# Patient Record
Sex: FEMALE | Race: ASIAN | NOT HISPANIC OR LATINO | URBAN - METROPOLITAN AREA
[De-identification: names, ages, dates, MRNs, and addresses within clinical notes are randomized per-mention and may not be internally consistent; named-entity substitution may affect disease eponyms.]

---

## 2018-01-31 ENCOUNTER — EMERGENCY (EMERGENCY)
Facility: HOSPITAL | Age: 43
LOS: 1 days | Discharge: HOME | End: 2018-01-31
Attending: EMERGENCY MEDICINE | Admitting: EMERGENCY MEDICINE
Payer: COMMERCIAL

## 2018-01-31 VITALS
TEMPERATURE: 98 F | HEART RATE: 73 BPM | SYSTOLIC BLOOD PRESSURE: 100 MMHG | DIASTOLIC BLOOD PRESSURE: 59 MMHG | OXYGEN SATURATION: 98 % | RESPIRATION RATE: 16 BRPM

## 2018-01-31 VITALS
RESPIRATION RATE: 72 BRPM | DIASTOLIC BLOOD PRESSURE: 71 MMHG | HEART RATE: 16 BPM | TEMPERATURE: 98 F | OXYGEN SATURATION: 100 % | SYSTOLIC BLOOD PRESSURE: 101 MMHG

## 2018-01-31 LAB
ALBUMIN SERPL ELPH-MCNC: 4.5 G/DL — SIGNIFICANT CHANGE UP (ref 3.3–5)
ALP SERPL-CCNC: 71 U/L — SIGNIFICANT CHANGE UP (ref 40–120)
ALT FLD-CCNC: 48 U/L — HIGH (ref 4–33)
APPEARANCE UR: CLEAR — SIGNIFICANT CHANGE UP
APTT BLD: 35.4 SEC — SIGNIFICANT CHANGE UP (ref 27.5–37.4)
AST SERPL-CCNC: 39 U/L — HIGH (ref 4–32)
BASOPHILS # BLD AUTO: 0.06 K/UL — SIGNIFICANT CHANGE UP (ref 0–0.2)
BASOPHILS NFR BLD AUTO: 0.7 % — SIGNIFICANT CHANGE UP (ref 0–2)
BILIRUB SERPL-MCNC: 0.5 MG/DL — SIGNIFICANT CHANGE UP (ref 0.2–1.2)
BILIRUB UR-MCNC: NEGATIVE — SIGNIFICANT CHANGE UP
BLOOD UR QL VISUAL: NEGATIVE — SIGNIFICANT CHANGE UP
BUN SERPL-MCNC: 14 MG/DL — SIGNIFICANT CHANGE UP (ref 7–23)
CALCIUM SERPL-MCNC: 9.2 MG/DL — SIGNIFICANT CHANGE UP (ref 8.4–10.5)
CHLORIDE SERPL-SCNC: 100 MMOL/L — SIGNIFICANT CHANGE UP (ref 98–107)
CHOLEST SERPL-MCNC: 149 MG/DL — SIGNIFICANT CHANGE UP (ref 120–199)
CO2 SERPL-SCNC: 25 MMOL/L — SIGNIFICANT CHANGE UP (ref 22–31)
COLOR SPEC: SIGNIFICANT CHANGE UP
CREAT SERPL-MCNC: 0.82 MG/DL — SIGNIFICANT CHANGE UP (ref 0.5–1.3)
EOSINOPHIL # BLD AUTO: 0.06 K/UL — SIGNIFICANT CHANGE UP (ref 0–0.5)
EOSINOPHIL NFR BLD AUTO: 0.7 % — SIGNIFICANT CHANGE UP (ref 0–6)
GLUCOSE SERPL-MCNC: 87 MG/DL — SIGNIFICANT CHANGE UP (ref 70–99)
GLUCOSE UR-MCNC: NEGATIVE — SIGNIFICANT CHANGE UP
HBA1C BLD-MCNC: 5.3 % — SIGNIFICANT CHANGE UP (ref 4–5.6)
HCT VFR BLD CALC: 42.9 % — SIGNIFICANT CHANGE UP (ref 34.5–45)
HDLC SERPL-MCNC: 62 MG/DL — SIGNIFICANT CHANGE UP (ref 45–65)
HGB BLD-MCNC: 13.9 G/DL — SIGNIFICANT CHANGE UP (ref 11.5–15.5)
IMM GRANULOCYTES # BLD AUTO: 0.03 # — SIGNIFICANT CHANGE UP
IMM GRANULOCYTES NFR BLD AUTO: 0.3 % — SIGNIFICANT CHANGE UP (ref 0–1.5)
INR BLD: 0.98 — SIGNIFICANT CHANGE UP (ref 0.88–1.17)
KETONES UR-MCNC: NEGATIVE — SIGNIFICANT CHANGE UP
LEUKOCYTE ESTERASE UR-ACNC: NEGATIVE — SIGNIFICANT CHANGE UP
LIPID PNL WITH DIRECT LDL SERPL: 80 MG/DL — SIGNIFICANT CHANGE UP
LYMPHOCYTES # BLD AUTO: 1.94 K/UL — SIGNIFICANT CHANGE UP (ref 1–3.3)
LYMPHOCYTES # BLD AUTO: 21.7 % — SIGNIFICANT CHANGE UP (ref 13–44)
MCHC RBC-ENTMCNC: 28.4 PG — SIGNIFICANT CHANGE UP (ref 27–34)
MCHC RBC-ENTMCNC: 32.4 % — SIGNIFICANT CHANGE UP (ref 32–36)
MCV RBC AUTO: 87.6 FL — SIGNIFICANT CHANGE UP (ref 80–100)
MONOCYTES # BLD AUTO: 0.37 K/UL — SIGNIFICANT CHANGE UP (ref 0–0.9)
MONOCYTES NFR BLD AUTO: 4.1 % — SIGNIFICANT CHANGE UP (ref 2–14)
MUCOUS THREADS # UR AUTO: SIGNIFICANT CHANGE UP
NEUTROPHILS # BLD AUTO: 6.49 K/UL — SIGNIFICANT CHANGE UP (ref 1.8–7.4)
NEUTROPHILS NFR BLD AUTO: 72.5 % — SIGNIFICANT CHANGE UP (ref 43–77)
NITRITE UR-MCNC: NEGATIVE — SIGNIFICANT CHANGE UP
NRBC # FLD: 0 — SIGNIFICANT CHANGE UP
PH UR: 6 — SIGNIFICANT CHANGE UP (ref 4.6–8)
PLATELET # BLD AUTO: 234 K/UL — SIGNIFICANT CHANGE UP (ref 150–400)
PMV BLD: 10.1 FL — SIGNIFICANT CHANGE UP (ref 7–13)
POTASSIUM SERPL-MCNC: 4.3 MMOL/L — SIGNIFICANT CHANGE UP (ref 3.5–5.3)
POTASSIUM SERPL-SCNC: 4.3 MMOL/L — SIGNIFICANT CHANGE UP (ref 3.5–5.3)
PROT SERPL-MCNC: 7.6 G/DL — SIGNIFICANT CHANGE UP (ref 6–8.3)
PROT UR-MCNC: NEGATIVE MG/DL — SIGNIFICANT CHANGE UP
PROTHROM AB SERPL-ACNC: 10.9 SEC — SIGNIFICANT CHANGE UP (ref 9.8–13.1)
RBC # BLD: 4.9 M/UL — SIGNIFICANT CHANGE UP (ref 3.8–5.2)
RBC # FLD: 12.8 % — SIGNIFICANT CHANGE UP (ref 10.3–14.5)
RBC CASTS # UR COMP ASSIST: SIGNIFICANT CHANGE UP (ref 0–?)
SODIUM SERPL-SCNC: 138 MMOL/L — SIGNIFICANT CHANGE UP (ref 135–145)
SP GR SPEC: 1.01 — SIGNIFICANT CHANGE UP (ref 1–1.04)
SQUAMOUS # UR AUTO: SIGNIFICANT CHANGE UP
TRIGL SERPL-MCNC: 41 MG/DL — SIGNIFICANT CHANGE UP (ref 10–149)
UROBILINOGEN FLD QL: NORMAL MG/DL — SIGNIFICANT CHANGE UP
WBC # BLD: 8.95 K/UL — SIGNIFICANT CHANGE UP (ref 3.8–10.5)
WBC # FLD AUTO: 8.95 K/UL — SIGNIFICANT CHANGE UP (ref 3.8–10.5)
WBC UR QL: SIGNIFICANT CHANGE UP (ref 0–?)

## 2018-01-31 PROCEDURE — 70544 MR ANGIOGRAPHY HEAD W/O DYE: CPT | Mod: 26,59

## 2018-01-31 PROCEDURE — 70551 MRI BRAIN STEM W/O DYE: CPT | Mod: 26

## 2018-01-31 PROCEDURE — 99218: CPT

## 2018-01-31 PROCEDURE — 70450 CT HEAD/BRAIN W/O DYE: CPT | Mod: 26

## 2018-01-31 PROCEDURE — 70549 MR ANGIOGRAPH NECK W/O&W/DYE: CPT | Mod: 26

## 2018-01-31 NOTE — CONSULT NOTE ADULT - SUBJECTIVE AND OBJECTIVE BOX
HPI:    42 year F with no past medical history presented to ED for evaluation of dizziness and right sided numbness.     She was in her usual state of health until this morning. At work around 8 pm, she had acute onset of spinning sensation associated with nausea. She try closing her eyes and sitting quit for a while but symptoms remained. Her friend recommended her to go ED. She also noticed that she was more numb on her right side of the body ( face, hand and leg) and weaker. Her symptoms are improving. Numbness, dizziness and weakness are getting better. No pronator drift noted but patient reported that she had weakness in her right hand and she was unable to hold straight like her left hand.     Her NIHSS is 2 and MRS is 0.         MEDICATIONS  (STANDING):    MEDICATIONS  (PRN):      PAST MEDICAL & SURGICAL HISTORY:  Post partum depression:    delivery delivered:  - due to fetal distress      FAMILY HISTORY:  Family history of diabetes mellitus (DM) (Mother)      Allergies    No Known Allergies    Intolerances          SHx - No smoking, No ETOH, No drug abuse      Review of Systems:  CONSTITUTIONAL:  No weight loss, fever, chills, weakness or fatigue.  HEENT:  Eyes:  No visual loss, blurred vision, double vision or yellow sclerae. Ears, Nose, Throat:  No hearing loss, sneezing, congestion, runny nose or sore throat.  SKIN:  No rash or itching.  CARDIOVASCULAR:  No chest pain, chest pressure or chest discomfort. No palpitations or edema.  RESPIRATORY:  No shortness of breath, cough or sputum.  GASTROINTESTINAL:  No anorexia, nausea, vomiting or diarrhea. No abdominal pain or blood.  GENITOURINARY:  NO Burning on urination.   NEUROLOGICAL: See HPI  MUSCULOSKELETAL:  No muscle, back pain, joint pain or stiffness.  HEMATOLOGIC:  No anemia, bleeding or bruising.  LYMPHATICS:  No enlarged nodes. No history of splenectomy.  PSYCHIATRIC:  No history of depression or anxiety.  ENDOCRINOLOGIC:  No reports of sweating, cold or heat intolerance. No polyuria or polydipsia.  ALLERGIES:  No history of asthma, hives, eczema or rhinitis.        Vital Signs Last 24 Hrs  T(C): 36.5 (2018 11:36), Max: 36.5 (2018 11:36)  T(F): 97.7 (2018 11:36), Max: 97.7 (2018 11:36)  HR: 89 (2018 14:49) (16 - 89)  BP: 143/99 (2018 14:49) (101/71 - 143/99)  BP(mean): --  RR: 18 (2018 14:49) (18 - 72)  SpO2: 100% (2018 14:49) (100% - 100%)    General Exam:   General appearance: No acute distress                   Neurological Exam:    Mental Status: Orientated to self, date and place.  Attention intact.  No dysarthria, aphasia or neglect.  Cranial Nerves: PERRL, EOMI, less sensation on right side of the face.  No facial asymmetry, Tongue, uvula and palate midline.      Motor:   Tone: normal.                  Strength:   right  4+/5 and right foot 4+/5, rest 5/5 in all four extremities   Pronator drift: none but patient reported that she had before              Dysmetria: None to finger-nose-finger or heel-shin-heel  No truncal ataxia.    Tremor: No resting, postural or action tremor.  No myoclonus.    Sensation: intact to pressure and vibration, decrease to light touch and cold sensation     Deep Tendon Reflexes: 1+ bilateral biceps, triceps, brachioradialis, 2+ knee and ankle bilaterally   Toes flexor bilaterally    Gait: normal and stable.      Other:        138  |  100  |  14  ----------------------------<  87  4.3   |  25  |  0.82    Ca    9.2      2018 13:20    TPro  7.6  /  Alb  4.5  /  TBili  0.5  /  DBili  x   /  AST  39<H>  /  ALT  48<H>  /  AlkPhos      138  |  100  |  14  ----------------------------<  87  4.3   |  25  |  0.82    Ca    9.2      2018 13:20    TPro  7.6  /  Alb  4.5  /  TBili  0.5  /  DBili  x   /  AST  39<H>  /  ALT  48<H>  /  AlkPhos                            13.9   8.95  )-----------( 234      ( 2018 13:20 )             42.9       Radiology    CT head    < from: CT Head No Cont (18 @ 14:11) >  IMPRESSION:    No acute intracranial pathology is noted. If symptoms persist, consider   short interval follow-up head CT or brain MRI, if there are no MRI   contraindications.      < end of copied text > HPI:    42 year F with no past medical history presented to ED for evaluation of dizziness and right sided numbness.     She was in her usual state of health until this morning. At work around 8 pm, she had acute onset of spinning sensation associated with nausea. She try closing her eyes and sitting quit for a while but symptoms remained. Her friend recommended her to go ED. She also noticed that she was more numb on her right side of the body ( face, hand and leg) and weaker. Her symptoms are improving. Numbness, dizziness and weakness are getting better. No pronator drift noted but patient reported that she had weakness in her right hand and she was unable to hold straight like her left hand.     Her NIHSS is 2 and MRS is 0.         MEDICATIONS  (STANDING):    MEDICATIONS  (PRN):      PAST MEDICAL & SURGICAL HISTORY:  Post partum depression:    delivery delivered:  - due to fetal distress      FAMILY HISTORY:  Family history of diabetes mellitus (DM) (Mother)      Allergies    No Known Allergies    Intolerances          SHx - No smoking, No ETOH, No drug abuse      Review of Systems:  CONSTITUTIONAL:  No weight loss, fever, chills, weakness or fatigue.  HEENT:  Eyes:  No visual loss, blurred vision, double vision or yellow sclerae. Ears, Nose, Throat:  No hearing loss, sneezing, congestion, runny nose or sore throat.  SKIN:  No rash or itching.  CARDIOVASCULAR:  No chest pain, chest pressure or chest discomfort. No palpitations or edema.  RESPIRATORY:  No shortness of breath, cough or sputum.  GASTROINTESTINAL:  No anorexia, nausea, vomiting or diarrhea. No abdominal pain or blood.  GENITOURINARY:  NO Burning on urination.   NEUROLOGICAL: See HPI  MUSCULOSKELETAL:  No muscle, back pain, joint pain or stiffness.  HEMATOLOGIC:  No anemia, bleeding or bruising.  LYMPHATICS:  No enlarged nodes. No history of splenectomy.  PSYCHIATRIC:  No history of depression or anxiety.  ENDOCRINOLOGIC:  No reports of sweating, cold or heat intolerance. No polyuria or polydipsia.  ALLERGIES:  No history of asthma, hives, eczema or rhinitis.        Vital Signs Last 24 Hrs  T(C): 36.5 (2018 11:36), Max: 36.5 (2018 11:36)  T(F): 97.7 (2018 11:36), Max: 97.7 (2018 11:36)  HR: 89 (2018 14:49) (16 - 89)  BP: 143/99 (2018 14:49) (101/71 - 143/99)  BP(mean): --  RR: 18 (2018 14:49) (18 - 72)  SpO2: 100% (2018 14:49) (100% - 100%)    General Exam:   General appearance: No acute distress                   Neurological Exam:    Mental Status: Orientated to self, date and place.  Attention intact.  No dysarthria, aphasia or neglect.  Cranial Nerves: PERRL, EOMI, less sensation on right side of the face.  No facial asymmetry, Tongue, uvula and palate midline.      Motor:   Tone: normal.                  Strength:   right  4+/5 and right foot 4+/5, rest 5/5 in all four extremities   Pronator drift: none but patient reported that she had before              Dysmetria: None to finger-nose-finger or heel-shin-heel  No truncal ataxia.    Tremor: No resting, postural or action tremor.  No myoclonus.    Sensation: intact to pressure and vibration, decrease to light touch and cold sensation     Deep Tendon Reflexes: 1+ bilateral biceps, triceps, brachioradialis, 2+ knee and ankle bilaterally   Toes flexor bilaterally    Gait: normal and stable.      Other:        138  |  100  |  14  ----------------------------<  87  4.3   |  25  |  0.82    Ca    9.2      2018 13:20    TPro  7.6  /  Alb  4.5  /  TBili  0.5  /  DBili  x   /  AST  39<H>  /  ALT  48<H>  /  AlkPhos      138  |  100  |  14  ----------------------------<  87  4.3   |  25  |  0.82    Ca    9.2      2018 13:20    TPro  7.6  /  Alb  4.5  /  TBili  0.5  /  DBili  x   /  AST  39<H>  /  ALT  48<H>  /  AlkPhos                            13.9   8.95  )-----------( 234      ( 2018 13:20 )             42.9       Radiology    CT head    < from: CT Head No Cont (18 @ 14:11) >  IMPRESSION:    No acute intracranial pathology is noted. If symptoms persist, consider   short interval follow-up head CT or brain MRI, if there are no MRI   contraindications.    < from: MR Head No Cont (18 @ 19:07) >  No acute infarct, mass effect or acute intracranial hemorrhage.     < from: MR Angio Head and Neck w/ IV Cont (18 @ 19:07) >  Unremarkable MRA of the head and neck.

## 2018-01-31 NOTE — ED ADULT NURSE NOTE - CHIEF COMPLAINT QUOTE
Pt sent from [facility] with weakness, R sided head pressure and nausea.  Denies chest pain, sob

## 2018-01-31 NOTE — ED CDU PROVIDER DISPOSITION NOTE - ATTENDING CONTRIBUTION TO CARE
Locbrittanio Locurto   pt evaluated for dischrage  Pt symptomatically improved  CN nl  strength symmetrical  5/5  no drift  sensation intact bilaterally  pt stable for discharge with outpatient follow up

## 2018-01-31 NOTE — ED CDU PROVIDER INITIAL DAY NOTE - MEDICAL DECISION MAKING DETAILS
43 yo F here for right sided facial numbness/heaviness today suddenly at work. on exam with notable weakness to RUE and RLL which has been improving and subjective R facial numbness. Sent to CDU for MRI/A and neuro. CT neg

## 2018-01-31 NOTE — ED CDU PROVIDER INITIAL DAY NOTE - PHYSICAL EXAMINATION
NEURO: EOMi, PERRLA, visual fields intact, tongue midline, negative romberg, strength 5/5 UE and LE on L side 4/5 on right, normal gait, facial expressions intact, point to point intact, rapid alternating movements intact, sensate intact to UE and LE bilaterally. NVI

## 2018-01-31 NOTE — ED PROVIDER NOTE - MEDICAL DECISION MAKING DETAILS
42 y.o female no pmhx presents with dizziness, R sided weakness / "funny feeling" on the R side since 8 am this morning while at work- symptoms improving- decreased strength and sensation on R sided- CT head, labs, EKG, Neuro consult.

## 2018-01-31 NOTE — ED CDU PROVIDER INITIAL DAY NOTE - OBJECTIVE STATEMENT
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 States feels like she has resolving novacaine on R cheek. Pt was driven to [facility] and was sent here for further evaluation. LMP last week. Denies headache, blurry vision, chest pain, SOB, cough, n/v/d, abdominal pain, urinary symptoms.   No family hx of MI or CVA

## 2018-01-31 NOTE — ED PROVIDER NOTE - OBJECTIVE STATEMENT
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 Pt was driven to [facility] and was sent here for further evaluation. LMP last week. Denies headache, blurry vision, chest pain, SOB, cough, n/v/d, abdominal pain, urinary symptoms.   No family hx of MI or CVA

## 2018-01-31 NOTE — CONSULT NOTE ADULT - ASSESSMENT
42 year F with no past medical history presented to ED for evaluation of dizziness and right sided numbness. Numbness, dizziness and weakness are getting better. Neurological examination was positive for decrease hearing on right side, decrease cold sensation and light tough on right with preservation of vibration and proprioception. Hand  and Foot dorsiflextion 4/5 ( She reported being more weak earlier, getting better now).     Impression     Pontomedullary syndrome     Plan     Permissive HTN x 24hrs 220/120  MRI brain w/o cont  MRA brain w/o cont   MRA neck w/ cont  HgA1c  Lipid profile  ASA 81mg and Plavix 75 for 3 weeks and then aspirin alone (Chance trial)   Atorvastatin 80 mg   TTE with bubble study can be done as outpatient if not done as in-patient   Telemetry monitoring  no PT/OT indicated   Patient passed bedside swallow eval   Outpatient neurology follow up 42 year F with no past medical history presented to ED for evaluation of dizziness and right sided numbness. Numbness, dizziness and weakness are getting better. Neurological examination was positive for decrease hearing on right side, decrease cold sensation and light tough on right with preservation of vibration and proprioception. Hand  and Foot dorsiflextion 4/5 (She reported being more weak earlier, getting better now).    Plan  A1C and Lipid profile wnl  ASA 81mg QD  TTE with bubble study can be done as outpatient  Patient passed bedside swallow eval  Outpatient neurology follow up 42 year F with no past medical history presented to ED for evaluation of dizziness and right sided numbness. Numbness, dizziness and weakness are getting better. Neurological examination was positive for decrease hearing on right side, decrease cold sensation and light tough on right with preservation of vibration and proprioception. Hand  and Foot dorsiflextion 4/5 (She reported being more weak earlier, getting better now). Imaging shows no stroke. Possible TIA.    Plan  A1C and Lipid profile wnl  ASA 81mg QD  TTE with bubble study can be done as outpatient  Patient passed bedside swallow eval  Outpatient neurology follow up 42 year F with no past medical history presented to ED for evaluation of dizziness and right sided numbness. Numbness, dizziness and weakness are getting better. Neurological examination was positive for decrease hearing on right side, decrease cold sensation and light tough on right with preservation of vibration and proprioception. Hand  and Foot dorsiflextion 4/5 (She reported being more weak earlier, getting better now). Imaging shows no stroke. Possible peripheral neuropathy.    Plan  A1C and Lipid profile wnl  TTE with bubble study can be done as outpatient  Patient passed bedside swallow eval  Outpatient neurology follow up 42 year F with no past medical history presented to ED for evaluation of dizziness and right sided numbness. Numbness, dizziness and weakness are getting better. Neurological examination was positive for decrease hearing on right side, decrease cold sensation and light tough on right with preservation of vibration and proprioception. Hand  and Foot dorsiflextion 4/5 (She reported being more weak earlier, getting better now). Imaging shows no stroke.    Plan  A1C and Lipid profile wnl  TTE with bubble study can be done as outpatient  Patient passed bedside swallow eval  Outpatient neurology follow up

## 2018-01-31 NOTE — ED ADULT TRIAGE NOTE - CHIEF COMPLAINT QUOTE
Pt sent from [facility] with weakness, R sided head pressure and nausea.  Denies chest pain, sob

## 2018-01-31 NOTE — ED CDU PROVIDER INITIAL DAY NOTE - ATTENDING CONTRIBUTION TO CARE
41 y/o f presented to ed with r. side numbness. Symptoms started around 8am, r. side felt numb and heavy (face, arm, and leg) with dizziness, described as both motion and lightheadedness. Tried to sit down for a while with no resolution of symptoms.  [facility] noted she had rue weakness and sent her to ed. In ed, patient evaluated by neurology, states symptoms have been slowly improving, still has some mild numbness left in face but remainder of symptoms have resolved. Never had this before, no associated fevers, chills, ha, neck pain, cp, sob, vomiting, diarrhea, dysuria. CT done with no acute findings, neurology recommended mri and further stroke w/u.    exam  GEN - NAD; well appearing; A+O x3   HEAD - NC/AT   EYES- PERRL, EOMI  ENT: Airway patent, mmm, Oral cavity and pharynx normal. No inflammation, swelling, exudate, or lesions.  NECK: Neck supple, non-tender without lymphadenopathy, no masses.  PULMONARY - CTA b/l, symmetric breath sounds.   CARDIAC -s1s2, RRR, no M,G,R  ABDOMEN - +BS, ND, NT, soft, no guarding, no rebound, no masses   BACK - no CVA tenderness, Normal  spine   EXTREMITIES - FROM, symmetric pulses, capillary refill < 2 seconds, no edema   SKIN - no rash or bruising   NEUROLOGIC - ao3, states r. cheek still with decreased sensation, remainder of cns intact, 5/5 strength in all extremities, sensation grossly intact, f-n nl, no dysdiadochokinesia, gait steady, rhomberg negative.  PSYCH -nl mood/affect, nl insight.  a/p-patient with r. side numbness/weakness, improving since initial eval, still has some r. facial numbness but otherwise well appearing. WIll monitor, mri/mra, w/u as per neurology rec, reass pending w/u.

## 2018-01-31 NOTE — ED CDU PROVIDER DISPOSITION NOTE - CLINICAL COURSE
[place]  pt evaluated for rt facial and hand numbness with associated weakness  [place] initially to have mild weakness at rt hand () and foot  CT  negative  placed in CDU  brain MRI  and MRA negative  pt symptoms subsequently improved

## 2018-01-31 NOTE — ED PROVIDER NOTE - ATTENDING CONTRIBUTION TO CARE
right sided upper and lower extremity numbness since * am felt legs heavy and fullness in right ear, symptoms of numness also. No dysarthria. On arrival, symptoms improving; no facial assymetry 5/5 lE bilaterally, gait normal without ataxia, ? RUE pronator drift. Imp; Brainstem CVA with improving symptoms, outside of TPA window on arrival. CT negative admit to CDU, discussed with Dr. Delgadillo

## 2018-01-31 NOTE — ED CDU PROVIDER DISPOSITION NOTE - PLAN OF CARE
Follow up with your primary care doctor and a neurologist within the next 2 - 3 days.    If you need to find a primary care doctor or specialist to follow up with, you may contact the [facility] Patient Access Services Department at [phone] to find contact info for providers that you can schedule follow up with.  Rest / stay well hydrated / avoid strenuous activity.  Return to the Emergency Department immediately if you have any new/worsening symptoms or have any problems/concerns/issues.

## 2018-01-31 NOTE — ED CDU PROVIDER INITIAL DAY NOTE - PROGRESS NOTE DETAILS
This patient was signed out to me by THIERRY Bolden and Dr. Delgadillo at 1900 hrs.; test results and orders reviewed.  In interim, pt has been resting comfortably with family at bedside.  MRI/MRA reports officially resulted; negative studies per reports; reviewed with current covering neuro resident Dr. Mesa who states pt may be discharged home for outpatient echo and with outpatient neuro and PMD follow up; no meds/other recommendations at this time.  [facility] attending Dr. Escamilla made aware; will be coming to clear pt for discharge home.

## 2018-02-01 LAB
BACTERIA UR CULT: SIGNIFICANT CHANGE UP
SPECIMEN SOURCE: SIGNIFICANT CHANGE UP

## 2024-12-21 ENCOUNTER — NON-APPOINTMENT (OUTPATIENT)
Age: 49
End: 2024-12-21